# Patient Record
Sex: FEMALE | Race: WHITE | NOT HISPANIC OR LATINO | Employment: OTHER | URBAN - METROPOLITAN AREA
[De-identification: names, ages, dates, MRNs, and addresses within clinical notes are randomized per-mention and may not be internally consistent; named-entity substitution may affect disease eponyms.]

---

## 2024-03-19 ENCOUNTER — OFFICE VISIT (OUTPATIENT)
Dept: UROLOGY | Facility: CLINIC | Age: 84
End: 2024-03-19
Payer: MEDICARE

## 2024-03-19 VITALS — WEIGHT: 187.8 LBS | HEIGHT: 60 IN | TEMPERATURE: 97.3 F | BODY MASS INDEX: 36.87 KG/M2

## 2024-03-19 DIAGNOSIS — N95.2 VAGINAL ATROPHY: ICD-10-CM

## 2024-03-19 DIAGNOSIS — N39.41 URGE INCONTINENCE: Primary | ICD-10-CM

## 2024-03-19 DIAGNOSIS — N94.9 VAGINAL LUMP: ICD-10-CM

## 2024-03-19 DIAGNOSIS — N20.0 KIDNEY STONE: ICD-10-CM

## 2024-03-19 PROCEDURE — 1159F MED LIST DOCD IN RCRD: CPT | Performed by: UROLOGY

## 2024-03-19 PROCEDURE — 99214 OFFICE O/P EST MOD 30 MIN: CPT | Performed by: UROLOGY

## 2024-03-19 PROCEDURE — 1036F TOBACCO NON-USER: CPT | Performed by: UROLOGY

## 2024-03-19 PROCEDURE — 51798 US URINE CAPACITY MEASURE: CPT | Performed by: UROLOGY

## 2024-03-19 RX ORDER — LEVOTHYROXINE SODIUM 137 UG/1
137 TABLET ORAL DAILY
COMMUNITY

## 2024-03-19 RX ORDER — OXYBUTYNIN CHLORIDE 15 MG/1
15 TABLET, EXTENDED RELEASE ORAL DAILY
COMMUNITY
End: 2024-03-19 | Stop reason: SDUPTHER

## 2024-03-19 RX ORDER — PREGABALIN 100 MG/1
100 CAPSULE ORAL 2 TIMES DAILY
COMMUNITY

## 2024-03-19 RX ORDER — OMEPRAZOLE 20 MG/1
20 CAPSULE, DELAYED RELEASE ORAL
COMMUNITY
Start: 2016-09-09

## 2024-03-19 RX ORDER — MECLIZINE HYDROCHLORIDE 25 MG/1
25 TABLET ORAL 3 TIMES DAILY PRN
COMMUNITY

## 2024-03-19 RX ORDER — FUROSEMIDE 40 MG/1
TABLET ORAL
COMMUNITY
Start: 2022-12-27

## 2024-03-19 RX ORDER — MONTELUKAST SODIUM 10 MG/1
10 TABLET ORAL NIGHTLY
COMMUNITY

## 2024-03-19 RX ORDER — LIDOCAINE 50 MG/G
PATCH TOPICAL
COMMUNITY
Start: 2015-05-03

## 2024-03-19 RX ORDER — AMLODIPINE BESYLATE 5 MG/1
5 TABLET ORAL DAILY
COMMUNITY

## 2024-03-19 RX ORDER — OXYBUTYNIN CHLORIDE 15 MG/1
15 TABLET, EXTENDED RELEASE ORAL DAILY
Qty: 90 TABLET | Refills: 3 | Status: SHIPPED | OUTPATIENT
Start: 2024-03-19 | End: 2025-03-19

## 2024-03-19 RX ORDER — BENZONATATE 100 MG/1
CAPSULE ORAL
COMMUNITY

## 2024-03-19 RX ORDER — PREDNISONE 10 MG/1
TABLET ORAL
COMMUNITY
Start: 2023-11-16 | End: 2024-05-07 | Stop reason: WASHOUT

## 2024-03-19 RX ORDER — MELATONIN 10 MG
10 CAPSULE ORAL
COMMUNITY
Start: 2016-09-09

## 2024-03-19 RX ORDER — PREDNISONE 2.5 MG/1
TABLET ORAL
COMMUNITY
End: 2024-05-07 | Stop reason: WASHOUT

## 2024-03-19 RX ORDER — ESTRADIOL 0.1 MG/G
CREAM VAGINAL
Qty: 42.5 G | Refills: 5 | Status: SHIPPED | OUTPATIENT
Start: 2024-03-19 | End: 2025-03-19

## 2024-03-19 RX ORDER — IBANDRONATE SODIUM 150 MG/1
TABLET, FILM COATED ORAL
COMMUNITY

## 2024-03-19 RX ORDER — CITALOPRAM 20 MG/1
20 TABLET, FILM COATED ORAL DAILY
COMMUNITY

## 2024-03-19 RX ORDER — ACETAMINOPHEN 500 MG
2 TABLET ORAL
COMMUNITY

## 2024-03-19 RX ORDER — ZINC GLUCONATE 50 MG
50 TABLET ORAL
COMMUNITY

## 2024-03-19 RX ORDER — CHOLECALCIFEROL (VITAMIN D3) 125 MCG
CAPSULE ORAL
COMMUNITY

## 2024-03-19 RX ORDER — FLUTICASONE PROPIONATE 50 MCG
SPRAY, SUSPENSION (ML) NASAL
COMMUNITY
Start: 2021-03-22

## 2024-03-19 NOTE — PROGRESS NOTES
Subjective   Ivon Guo is a 83 y.o. female with a history of urge incontinence managed on Oxybutynin 15 mg daily and vaginal atrophy, presenting today for a follow up visit. Patient has new complaints of growth on labia. Patient has no new urinary complaints today and states that her urge incontinence is better with oxybutynin.       Past Medical History:   Diagnosis Date    Essential (primary) hypertension     Hypertension, benign    Personal history of other diseases of the circulatory system     History of aortic valve stenosis    Personal history of other diseases of the circulatory system     History of coronary artery disease    Personal history of other diseases of the circulatory system     History of sinus bradycardia    Personal history of other diseases of the digestive system 07/19/2017    History of gastroesophageal reflux (GERD)    Personal history of other diseases of the musculoskeletal system and connective tissue     History of osteopenia    Personal history of other diseases of the musculoskeletal system and connective tissue     History of degenerative joint disease    Personal history of other endocrine, nutritional and metabolic disease     History of hyperlipidemia    Personal history of other medical treatment 09/07/2016    History of mammogram    Personal history of other medical treatment     History of bone density study    Personal history of other specified conditions     History of palpitations    Personal history of other specified conditions     History of shortness of breath    Unspecified asthma, uncomplicated 07/19/2017    Asthma without status asthmaticus     Past Surgical History:   Procedure Laterality Date    APPENDECTOMY  07/19/2017    Appendectomy    CARPAL TUNNEL RELEASE  03/26/2018    Neuroplasty Decompression Median Nerve At Carpal Tunnel    COLONOSCOPY  03/26/2018    Colonoscopy     No family history on file.  Current Outpatient Medications   Medication Sig Dispense  Refill    fluticasone (Flonase) 50 mcg/actuation nasal spray SPRAY 1 SPRAY INTO EACH NOSTRIL AT BEDTIME      furosemide (Lasix) 40 mg tablet TAKE 1 TABLET ONCE DAILY FOR AS NEEDED FOR SWELLING      lactobacillus combination no.4 3 billion cell capsule Take 1 capsule by mouth once daily.      lidocaine (Lidoderm) 5 % patch APPLY ONE(1) PATCH TO AFFECTED AREA ONCE DAILY. REMOVE PATCH AFTER 12 HOURS.      melatonin 10 mg capsule Take 1 capsule (10 mg) by mouth once daily.      omeprazole (PriLOSEC) 20 mg DR capsule Take 1 capsule (20 mg) by mouth once daily.      predniSONE (Deltasone) 10 mg tablet TAKE 1 TABLET BY MOUTH ONCE DAILY. TO TAKE DAILY AS DIRECTED ACCORDING TO ESTABLISHED TAPER REGIMEN      amLODIPine (Norvasc) 5 mg tablet Take 1 tablet (5 mg) by mouth once daily.      benzonatate (Tessalon) 100 mg capsule TAKE 1 CAPSULE BY MOUTH 3 TIMES A DAY AS NEEDED FOR COUGH      CALCIUM CARBONATE-VITAMIN D3 ORAL Take 1 tablet by mouth once daily.      cholecalciferol (Vitamin D-3) 5,000 Units tablet Take 2 tablets (10,000 Units) by mouth once daily.      citalopram (CeleXA) 20 mg tablet Take 1 tablet (20 mg) by mouth once daily.      ibandronate (Boniva) 150 mg tablet TAKE 1 TABLET BY MOUTH ONCE EVERY MONTH ON EMPTY STOMACH NOTHING BY MOUTH OR LAYING DOWN X 60 MINS.      lactase (Lactaid) 3,000 unit tablet Take by mouth.      levothyroxine (Synthroid, Levoxyl) 137 mcg tablet Take 1 tablet (137 mcg) by mouth once daily.      meclizine (Antivert) 25 mg tablet Take 1 tablet (25 mg) by mouth 3 times a day as needed for dizziness.      montelukast (Singulair) 10 mg tablet Take 1 tablet (10 mg) by mouth once daily at bedtime.      oxybutynin XL (Ditropan-XL) 15 mg 24 hr tablet Take 1 tablet (15 mg) by mouth once daily.      predniSONE (Deltasone) 2.5 mg tablet PLEASE SEE ATTACHED FOR DETAILED DIRECTIONS      pregabalin (Lyrica) 100 mg capsule Take 1 capsule (100 mg) by mouth 2 times a day.      zinc gluconate 50 mg tablet  "Take 1 tablet (50 mg) by mouth once daily.       No current facility-administered medications for this visit.     Allergies   Allergen Reactions    Adhesive Tape-Silicones Other    Iodinated Contrast Media Confusion     IVP dye and Cath dye    - \"like crawling out of her skin\"    Olmesartan Other     Social History     Socioeconomic History    Marital status:      Spouse name: Not on file    Number of children: Not on file    Years of education: Not on file    Highest education level: Not on file   Occupational History    Not on file   Tobacco Use    Smoking status: Never    Smokeless tobacco: Never   Substance and Sexual Activity    Alcohol use: Not on file    Drug use: Not on file    Sexual activity: Not on file   Other Topics Concern    Not on file   Social History Narrative    Not on file     Social Determinants of Health     Financial Resource Strain: Not on file   Food Insecurity: Not on file   Transportation Needs: Not on file   Physical Activity: Not on file   Stress: Not on file   Social Connections: Not on file   Intimate Partner Violence: Not on file   Housing Stability: Not on file       Review of Systems  Pertinent items are noted in HPI.    Objective     Lab Review  PVR 0ml     Assessment/Plan   Diagnoses and all orders for this visit:  Kidney stone  -     Measure post void residual  Urge incontinence    Lesion on Labia    We will refer patient to urogynecology for further evaluation.       Urge incontinence     Doing well on Oxybutynin, we will refill this.     3.  Vaginal atrophy.  Will refill estrogen      All questions were answered to the patient's satisfaction. Patient agrees with the plan and wishes to proceed. Follow-up will be scheduled appropriately.     E&M visit today is associated with current or anticipated ongoing medical care services related to a patient's single, serious condition or a complex condition.    Scribed for Dr. Warren by Jessica Ayala. I , Dr Warren, have personally " reviewed and agreed with the information entered by the Virtual Scribe.

## 2024-04-17 ENCOUNTER — TELEPHONE (OUTPATIENT)
Dept: CARDIOLOGY | Facility: CLINIC | Age: 84
End: 2024-04-17
Payer: MEDICARE

## 2024-04-17 DIAGNOSIS — R06.02 SHORTNESS OF BREATH: ICD-10-CM

## 2024-04-17 NOTE — TELEPHONE ENCOUNTER
Pt called with c/o feet and ankle swelling not going down. She is currently taking torsemide 20mg daily. The swelling is down first thing in the morning. Discussed with Dr. Villalba and he would like to see her sooner with an echo first and to elevate legs when she can. Scheduled the testing and OV 5/7 and patient good.

## 2024-04-18 PROBLEM — R29.6 FALLS FREQUENTLY: Status: ACTIVE | Noted: 2021-01-27

## 2024-04-18 PROBLEM — M31.6 GIANT CELL ARTERITIS (MULTI): Status: ACTIVE | Noted: 2024-01-04

## 2024-04-18 PROBLEM — I10 ESSENTIAL HYPERTENSION: Status: ACTIVE | Noted: 2017-07-24

## 2024-04-18 PROBLEM — K21.9 GERD (GASTROESOPHAGEAL REFLUX DISEASE): Status: ACTIVE | Noted: 2021-08-09

## 2024-04-18 PROBLEM — R06.09 EXERTIONAL DYSPNEA: Status: ACTIVE | Noted: 2024-04-18

## 2024-04-18 PROBLEM — E78.5 HYPERLIPIDEMIA: Status: ACTIVE | Noted: 2024-04-18

## 2024-04-18 PROBLEM — I25.10 ARTERIOSCLEROSIS OF CORONARY ARTERY: Status: ACTIVE | Noted: 2021-08-09

## 2024-04-18 PROBLEM — I50.32 CHRONIC DIASTOLIC HEART FAILURE (MULTI): Status: ACTIVE | Noted: 2023-09-26

## 2024-04-18 PROBLEM — M48.02 SPINAL STENOSIS OF CERVICAL REGION: Status: ACTIVE | Noted: 2023-03-23

## 2024-04-18 PROBLEM — N18.2 CKD (CHRONIC KIDNEY DISEASE), STAGE II: Status: ACTIVE | Noted: 2022-12-20

## 2024-04-18 PROBLEM — M35.3 POLYMYALGIA RHEUMATICA (MULTI): Status: ACTIVE | Noted: 2023-01-03

## 2024-05-07 ENCOUNTER — OFFICE VISIT (OUTPATIENT)
Dept: CARDIOLOGY | Facility: CLINIC | Age: 84
End: 2024-05-07
Payer: MEDICARE

## 2024-05-07 ENCOUNTER — HOSPITAL ENCOUNTER (OUTPATIENT)
Dept: CARDIOLOGY | Facility: CLINIC | Age: 84
Discharge: HOME | End: 2024-05-07
Payer: MEDICARE

## 2024-05-07 VITALS
BODY MASS INDEX: 36.71 KG/M2 | WEIGHT: 187 LBS | HEART RATE: 70 BPM | DIASTOLIC BLOOD PRESSURE: 70 MMHG | OXYGEN SATURATION: 94 % | SYSTOLIC BLOOD PRESSURE: 126 MMHG | HEIGHT: 60 IN

## 2024-05-07 DIAGNOSIS — I35.0 MODERATE AORTIC STENOSIS: ICD-10-CM

## 2024-05-07 DIAGNOSIS — E78.5 HYPERLIPIDEMIA, UNSPECIFIED HYPERLIPIDEMIA TYPE: ICD-10-CM

## 2024-05-07 DIAGNOSIS — I35.0 NONRHEUMATIC AORTIC (VALVE) STENOSIS: ICD-10-CM

## 2024-05-07 DIAGNOSIS — I10 ESSENTIAL HYPERTENSION: ICD-10-CM

## 2024-05-07 DIAGNOSIS — R06.02 SHORTNESS OF BREATH: ICD-10-CM

## 2024-05-07 DIAGNOSIS — R06.09 EXERTIONAL DYSPNEA: ICD-10-CM

## 2024-05-07 DIAGNOSIS — I50.32 CHRONIC DIASTOLIC HEART FAILURE (MULTI): ICD-10-CM

## 2024-05-07 DIAGNOSIS — I25.10 ARTERIOSCLEROSIS OF CORONARY ARTERY: Primary | ICD-10-CM

## 2024-05-07 PROCEDURE — 93306 TTE W/DOPPLER COMPLETE: CPT

## 2024-05-07 PROCEDURE — 99214 OFFICE O/P EST MOD 30 MIN: CPT | Performed by: INTERNAL MEDICINE

## 2024-05-07 PROCEDURE — 3074F SYST BP LT 130 MM HG: CPT | Performed by: INTERNAL MEDICINE

## 2024-05-07 PROCEDURE — 1159F MED LIST DOCD IN RCRD: CPT | Performed by: INTERNAL MEDICINE

## 2024-05-07 PROCEDURE — 93306 TTE W/DOPPLER COMPLETE: CPT | Performed by: INTERNAL MEDICINE

## 2024-05-07 PROCEDURE — 3078F DIAST BP <80 MM HG: CPT | Performed by: INTERNAL MEDICINE

## 2024-05-07 PROCEDURE — 1036F TOBACCO NON-USER: CPT | Performed by: INTERNAL MEDICINE

## 2024-05-07 RX ORDER — PREDNISONE 1 MG/1
TABLET ORAL
COMMUNITY
Start: 2024-05-03 | End: 2024-05-07 | Stop reason: WASHOUT

## 2024-05-07 RX ORDER — ZOSTER VACCINE RECOMBINANT, ADJUVANTED 50 MCG/0.5
KIT INTRAMUSCULAR
COMMUNITY
End: 2024-05-07 | Stop reason: WASHOUT

## 2024-05-07 RX ORDER — PREDNISONE 5 MG/1
TABLET ORAL
COMMUNITY
End: 2024-05-07 | Stop reason: WASHOUT

## 2024-05-07 RX ORDER — ACETAMINOPHEN 500 MG
500 TABLET ORAL EVERY 8 HOURS PRN
COMMUNITY
Start: 2023-10-21

## 2024-05-07 RX ORDER — CEPHALEXIN 500 MG/1
500 CAPSULE ORAL 2 TIMES DAILY
COMMUNITY
Start: 2024-01-11

## 2024-05-07 ASSESSMENT — ENCOUNTER SYMPTOMS
MYALGIAS: 1
DYSPNEA ON EXERTION: 1

## 2024-05-07 NOTE — PATIENT INSTRUCTIONS
Your aortic valve looks unchanged from a year ago.    No changes in your medications    Keep on the furosemide 20 mg daily.

## 2024-05-07 NOTE — PROGRESS NOTES
Subjective   Ivon Guo is a 83 y.o. female.    Chief Complaint:  Follow-up aortic stenosis, coronary artery disease.    HPI    Since we last saw her in August 2023 she has done well.  She is on steroid therapy and started off at 60 mg daily.  She was diagnosed with giant cell arteritis.  Her steroids have been tapered down to 7 mg.  Overall she she has felt well.  No anginal symptoms.  Mild exertional dyspnea.  No syncopal events.  Should be noted that she also carries a diagnosis of polymyalgia rheumatica.    Cardiac history is significant for coronary artery disease, aortic stenosis, and hypertension.  Her EKGs have demonstrated a left bundle branch block.       A CAT scan of the chest performed on 8/1/17 demonstrated minimal calcification in the distribution of the left anterior descending coronary artery with minimal calcification in the distribution of the aorta. This is consistent with the presence of mild atherosclerotic coronary artery disease.    Allergies  Medication    · Iodinated Contrast Media   Recorded By: Jocelyn Munoz; 3/26/2018 9:12:01 AM  NonMedication    · Adhesive Tape   Recorded By: Jocelyn Munoz; 3/26/2018 9:12:01 AM   · Milk   Recorded By: Jocelyn Munoz; 3/26/2018 9:12:01 AM     Family History  Mother    · Family history of congestive heart failure (V17.49) (Z82.49)  Father    · Family history of malignant neoplasm (V16.9) (Z80.9)  Sister    · Family history of myocardial infarction (V17.3) (Z82.49)   · age 52  Brother    · Family history of S/P CABG (coronary artery bypass graft)   · x4 age 46  Paternal Grandfather    · Family history of myocardial infarction (V17.3) (Z82.49)   · age 52     Social History  Problems    · Former smoker (V15.82) (Z87.891)   · quit over 10 years ago   ·    · No illicit drug use   · Retired   · Social alcohol use (Z78.9)    Review of Systems   Cardiovascular:  Positive for dyspnea on exertion.   Musculoskeletal:  Positive for arthritis, joint pain  and myalgias.   All other systems reviewed and are negative.      Visit Vitals  /70 (BP Location: Left arm)   Pulse 70   Ht 1.524 m (5')   Wt 84.8 kg (187 lb)   SpO2 94%   BMI 36.52 kg/m²   Smoking Status Never   BSA 1.89 m²        Objective     Constitutional:       Appearance: Not in distress.   Neck:      Vascular: JVD normal.   Pulmonary:      Breath sounds: Normal breath sounds.   Cardiovascular:      Normal rate. Regular rhythm. distant S1. Normal S2.       Murmurs: There is a grade 3/6 systolic murmur.      No gallop.    Pulses:     Intact distal pulses.   Edema:     Peripheral edema absent.   Abdominal:      General: There is no distension.      Palpations: Abdomen is soft.   Neurological:      Mental Status: Alert.       Assessment:    1.  Aortic stenosis.  Today's echocardiographic study shows moderate aortic stenosis with mild worsening of her gradient from her last echocardiographic study.  She remains asymptomatic with respect to her aortic stenosis.  We will repeat her echo study next April.  I suspect she is about 2 years away from needing transaortic valve replacement.    2.  Coronary disease.  Minimal coronary atherosclerosis based on a prior CT scan of the chest.    3.  Chronic diastolic congestive heart failure.  As a result of the aortic stenosis.  No heart failure by exam.  She is on furosemide 20 mg daily.    4.  Hypertension.  Blood pressures are well-controlled.

## 2024-05-08 LAB
AORTIC VALVE MEAN GRADIENT: 26.9 MMHG
AORTIC VALVE PEAK VELOCITY: 3.77 M/S
AV PEAK GRADIENT: 56.9 MMHG
AVA (PEAK VEL): 1.22 CM2
AVA (VTI): 1.37 CM2
EJECTION FRACTION APICAL 4 CHAMBER: 63.7
LEFT VENTRICLE INTERNAL DIMENSION DIASTOLE: 4.53 CM (ref 3.5–6)
LEFT VENTRICULAR OUTFLOW TRACT DIAMETER: 2 CM
LV EJECTION FRACTION BIPLANE: 60 %
RIGHT VENTRICLE FREE WALL PEAK S': 0.12 CM/S
RIGHT VENTRICLE PEAK SYSTOLIC PRESSURE: 37.8 MMHG
TRICUSPID ANNULAR PLANE SYSTOLIC EXCURSION: 1.9 CM

## 2024-05-15 ENCOUNTER — APPOINTMENT (OUTPATIENT)
Dept: OBSTETRICS AND GYNECOLOGY | Facility: CLINIC | Age: 84
End: 2024-05-15
Payer: MEDICARE

## 2024-06-23 DIAGNOSIS — I10 ESSENTIAL HYPERTENSION: Primary | ICD-10-CM

## 2024-06-26 RX ORDER — LOSARTAN POTASSIUM 50 MG/1
50 TABLET ORAL DAILY
Qty: 90 TABLET | Refills: 3 | Status: SHIPPED | OUTPATIENT
Start: 2024-06-26

## 2024-09-03 ENCOUNTER — APPOINTMENT (OUTPATIENT)
Dept: CARDIOLOGY | Facility: CLINIC | Age: 84
End: 2024-09-03
Payer: MEDICARE

## 2024-09-03 DIAGNOSIS — I50.32 CHRONIC DIASTOLIC HEART FAILURE (MULTI): ICD-10-CM

## 2024-09-03 DIAGNOSIS — I35.0 MODERATE AORTIC STENOSIS: ICD-10-CM

## 2025-04-07 ENCOUNTER — APPOINTMENT (OUTPATIENT)
Dept: CARDIOLOGY | Facility: CLINIC | Age: 85
End: 2025-04-07
Payer: MEDICARE

## 2025-04-07 DIAGNOSIS — N39.41 URGE INCONTINENCE: ICD-10-CM

## 2025-04-08 RX ORDER — OXYBUTYNIN CHLORIDE 15 MG/1
15 TABLET, EXTENDED RELEASE ORAL DAILY
Qty: 90 TABLET | Refills: 3 | Status: SHIPPED | OUTPATIENT
Start: 2025-04-08

## 2025-08-11 DIAGNOSIS — I10 ESSENTIAL HYPERTENSION: Primary | ICD-10-CM

## 2025-08-11 RX ORDER — LOSARTAN POTASSIUM 50 MG/1
50 TABLET ORAL DAILY
Qty: 90 TABLET | Refills: 3 | Status: SHIPPED | OUTPATIENT
Start: 2025-08-11

## 2025-08-12 PROBLEM — N18.32 STAGE 3B CHRONIC KIDNEY DISEASE (MULTI): Status: ACTIVE | Noted: 2024-04-04

## 2025-08-12 PROBLEM — N18.2 CKD (CHRONIC KIDNEY DISEASE), STAGE II: Status: RESOLVED | Noted: 2022-12-20 | Resolved: 2025-08-12

## 2025-09-04 ENCOUNTER — APPOINTMENT (OUTPATIENT)
Dept: CARDIOLOGY | Facility: CLINIC | Age: 85
End: 2025-09-04
Payer: MEDICARE

## 2025-09-04 ENCOUNTER — HOSPITAL ENCOUNTER (OUTPATIENT)
Dept: CARDIOLOGY | Facility: CLINIC | Age: 85
Discharge: HOME | End: 2025-09-04
Payer: MEDICARE

## 2025-09-04 DIAGNOSIS — I50.32 CHRONIC DIASTOLIC (CONGESTIVE) HEART FAILURE: ICD-10-CM

## 2025-09-04 DIAGNOSIS — I35.0 NONRHEUMATIC AORTIC (VALVE) STENOSIS: ICD-10-CM

## 2025-09-04 PROBLEM — D63.8 ANEMIA, CHRONIC DISEASE: Status: ACTIVE | Noted: 2025-06-18

## 2025-09-04 PROBLEM — R97.0 ELEVATED CEA: Status: ACTIVE | Noted: 2025-01-03

## 2025-09-04 PROBLEM — C85.80 MARGINAL ZONE LYMPHOMA (MULTI): Status: ACTIVE | Noted: 2025-02-20

## 2025-09-04 PROBLEM — R59.0 INGUINAL LYMPHADENOPATHY: Status: ACTIVE | Noted: 2025-01-01

## 2025-09-04 PROBLEM — R55 SYNCOPE AND COLLAPSE: Status: ACTIVE | Noted: 2025-09-04

## 2025-09-04 PROBLEM — D70.1 CHEMOTHERAPY-INDUCED NEUTROPENIA: Status: ACTIVE | Noted: 2025-06-30

## 2025-09-04 PROBLEM — J15.9 COMMUNITY ACQUIRED BACTERIAL PNEUMONIA: Status: ACTIVE | Noted: 2024-12-30

## 2025-09-04 PROBLEM — R53.1 GENERALIZED WEAKNESS: Status: ACTIVE | Noted: 2024-08-22

## 2025-09-04 PROBLEM — D64.9 ANEMIA, NORMOCYTIC NORMOCHROMIC: Status: ACTIVE | Noted: 2025-01-01

## 2025-09-04 PROBLEM — T45.1X5A CHEMOTHERAPY-INDUCED NEUTROPENIA: Status: ACTIVE | Noted: 2025-06-30

## 2025-09-04 LAB — BODY SURFACE AREA: 1.9 M2

## 2025-09-04 PROCEDURE — 93306 TTE W/DOPPLER COMPLETE: CPT
